# Patient Record
Sex: FEMALE | Race: WHITE
[De-identification: names, ages, dates, MRNs, and addresses within clinical notes are randomized per-mention and may not be internally consistent; named-entity substitution may affect disease eponyms.]

---

## 2017-02-02 ENCOUNTER — HOSPITAL ENCOUNTER (OUTPATIENT)
Dept: HOSPITAL 80 - BMCIMAGING | Age: 57
End: 2017-02-02
Attending: GENERAL ACUTE CARE HOSPITAL
Payer: COMMERCIAL

## 2017-02-02 DIAGNOSIS — Z12.31: Primary | ICD-10-CM

## 2017-02-02 DIAGNOSIS — Z80.3: ICD-10-CM

## 2017-02-02 PROCEDURE — G0202 SCR MAMMO BI INCL CAD: HCPCS

## 2017-02-02 NOTE — MA
Screening Digital Mammogram



Clinical Indications: Routine screening. Mother with breast cancer at age 52.



Technique:  Standard cephalocaudal and mediolateral oblique projections are obtained. An additional o
blique lateral views performed of the right breast. This examination is processed by the 8dig[employer]
r aided detection system. 



Comparison: January 2016, January 2015, December 2013 and December 2012



Breast density: B; There are scattered fibroglandular densities.



Findings: CAD was reviewed.  No suspicious findings are identified.



Impression: Negative mammogram. . 



BI-RADS 1.



Recommendation:   Routine screening is recommended in one year.



[facility] will send a result letter to the patient.



Negative mammography should not preclude additional workup of a clinically suspicious finding.



The patient's information is entered into a reminder system with a target due date for her next mammo
gram.

## 2018-02-02 ENCOUNTER — HOSPITAL ENCOUNTER (OUTPATIENT)
Dept: HOSPITAL 80 - BMCIMAGING | Age: 58
End: 2018-02-02
Attending: PHYSICIAN ASSISTANT
Payer: COMMERCIAL

## 2018-02-02 DIAGNOSIS — Z80.3: ICD-10-CM

## 2018-02-02 DIAGNOSIS — Z12.31: Primary | ICD-10-CM

## 2018-02-12 ENCOUNTER — HOSPITAL ENCOUNTER (OUTPATIENT)
Dept: HOSPITAL 80 - BMCIMAGING | Age: 58
End: 2018-02-12
Attending: PHYSICIAN ASSISTANT
Payer: COMMERCIAL

## 2018-02-12 DIAGNOSIS — R92.8: Primary | ICD-10-CM

## 2019-02-25 ENCOUNTER — HOSPITAL ENCOUNTER (OUTPATIENT)
Dept: HOSPITAL 80 - BMCIMAGING | Age: 59
End: 2019-02-25
Attending: PHYSICIAN ASSISTANT
Payer: COMMERCIAL

## 2019-02-25 DIAGNOSIS — Z79.890: ICD-10-CM

## 2019-02-25 DIAGNOSIS — Z80.3: ICD-10-CM

## 2019-02-25 DIAGNOSIS — Z12.31: Primary | ICD-10-CM

## 2019-06-07 ENCOUNTER — HOSPITAL ENCOUNTER (OUTPATIENT)
Dept: HOSPITAL 80 - BMCIMAGING | Age: 59
End: 2019-06-07
Payer: COMMERCIAL

## 2019-06-24 ENCOUNTER — HOSPITAL ENCOUNTER (OUTPATIENT)
Dept: HOSPITAL 80 - FIMAGING | Age: 59
End: 2019-06-24
Payer: COMMERCIAL